# Patient Record
Sex: MALE | Race: ASIAN | NOT HISPANIC OR LATINO | URBAN - METROPOLITAN AREA
[De-identification: names, ages, dates, MRNs, and addresses within clinical notes are randomized per-mention and may not be internally consistent; named-entity substitution may affect disease eponyms.]

---

## 2024-10-15 ENCOUNTER — INPATIENT (INPATIENT)
Facility: HOSPITAL | Age: 64
LOS: 0 days | Discharge: ANOTHER HOSPITAL | End: 2024-10-16
Attending: STUDENT IN AN ORGANIZED HEALTH CARE EDUCATION/TRAINING PROGRAM | Admitting: STUDENT IN AN ORGANIZED HEALTH CARE EDUCATION/TRAINING PROGRAM
Payer: MEDICAID

## 2024-10-15 VITALS
DIASTOLIC BLOOD PRESSURE: 98 MMHG | HEART RATE: 84 BPM | SYSTOLIC BLOOD PRESSURE: 165 MMHG | WEIGHT: 164.91 LBS | RESPIRATION RATE: 18 BRPM | OXYGEN SATURATION: 99 % | TEMPERATURE: 98 F

## 2024-10-15 DIAGNOSIS — Z95.5 PRESENCE OF CORONARY ANGIOPLASTY IMPLANT AND GRAFT: Chronic | ICD-10-CM

## 2024-10-15 DIAGNOSIS — R07.9 CHEST PAIN, UNSPECIFIED: ICD-10-CM

## 2024-10-15 LAB
ALBUMIN SERPL ELPH-MCNC: 4.2 G/DL — SIGNIFICANT CHANGE UP (ref 3.3–5)
ALP SERPL-CCNC: 62 U/L — SIGNIFICANT CHANGE UP (ref 40–120)
ALT FLD-CCNC: 15 U/L — SIGNIFICANT CHANGE UP (ref 4–41)
ANION GAP SERPL CALC-SCNC: 12 MMOL/L — SIGNIFICANT CHANGE UP (ref 7–14)
APTT BLD: 30.5 SEC — SIGNIFICANT CHANGE UP (ref 24.5–35.6)
AST SERPL-CCNC: 21 U/L — SIGNIFICANT CHANGE UP (ref 4–40)
BASOPHILS # BLD AUTO: 0.05 K/UL — SIGNIFICANT CHANGE UP (ref 0–0.2)
BASOPHILS NFR BLD AUTO: 0.7 % — SIGNIFICANT CHANGE UP (ref 0–2)
BILIRUB SERPL-MCNC: 0.5 MG/DL — SIGNIFICANT CHANGE UP (ref 0.2–1.2)
BUN SERPL-MCNC: 13 MG/DL — SIGNIFICANT CHANGE UP (ref 7–23)
CALCIUM SERPL-MCNC: 9.2 MG/DL — SIGNIFICANT CHANGE UP (ref 8.4–10.5)
CHLORIDE SERPL-SCNC: 105 MMOL/L — SIGNIFICANT CHANGE UP (ref 98–107)
CO2 SERPL-SCNC: 21 MMOL/L — LOW (ref 22–31)
CREAT SERPL-MCNC: 0.73 MG/DL — SIGNIFICANT CHANGE UP (ref 0.5–1.3)
EGFR: 103 ML/MIN/1.73M2 — SIGNIFICANT CHANGE UP
EOSINOPHIL # BLD AUTO: 0.46 K/UL — SIGNIFICANT CHANGE UP (ref 0–0.5)
EOSINOPHIL NFR BLD AUTO: 6.7 % — HIGH (ref 0–6)
GLUCOSE BLDC GLUCOMTR-MCNC: 153 MG/DL — HIGH (ref 70–99)
GLUCOSE BLDC GLUCOMTR-MCNC: 88 MG/DL — SIGNIFICANT CHANGE UP (ref 70–99)
GLUCOSE SERPL-MCNC: 149 MG/DL — HIGH (ref 70–99)
HCT VFR BLD CALC: 42 % — SIGNIFICANT CHANGE UP (ref 39–50)
HGB BLD-MCNC: 14.6 G/DL — SIGNIFICANT CHANGE UP (ref 13–17)
IANC: 3.87 K/UL — SIGNIFICANT CHANGE UP (ref 1.8–7.4)
IMM GRANULOCYTES NFR BLD AUTO: 0.4 % — SIGNIFICANT CHANGE UP (ref 0–0.9)
INR BLD: 0.95 RATIO — SIGNIFICANT CHANGE UP (ref 0.85–1.16)
LIDOCAIN IGE QN: 29 U/L — SIGNIFICANT CHANGE UP (ref 7–60)
LYMPHOCYTES # BLD AUTO: 1.83 K/UL — SIGNIFICANT CHANGE UP (ref 1–3.3)
LYMPHOCYTES # BLD AUTO: 26.7 % — SIGNIFICANT CHANGE UP (ref 13–44)
MCHC RBC-ENTMCNC: 31.2 PG — SIGNIFICANT CHANGE UP (ref 27–34)
MCHC RBC-ENTMCNC: 34.8 GM/DL — SIGNIFICANT CHANGE UP (ref 32–36)
MCV RBC AUTO: 89.7 FL — SIGNIFICANT CHANGE UP (ref 80–100)
MONOCYTES # BLD AUTO: 0.61 K/UL — SIGNIFICANT CHANGE UP (ref 0–0.9)
MONOCYTES NFR BLD AUTO: 8.9 % — SIGNIFICANT CHANGE UP (ref 2–14)
NEUTROPHILS # BLD AUTO: 3.87 K/UL — SIGNIFICANT CHANGE UP (ref 1.8–7.4)
NEUTROPHILS NFR BLD AUTO: 56.6 % — SIGNIFICANT CHANGE UP (ref 43–77)
NRBC # BLD: 0 /100 WBCS — SIGNIFICANT CHANGE UP (ref 0–0)
NRBC # FLD: 0 K/UL — SIGNIFICANT CHANGE UP (ref 0–0)
NT-PROBNP SERPL-SCNC: 37 PG/ML — SIGNIFICANT CHANGE UP
PLATELET # BLD AUTO: 228 K/UL — SIGNIFICANT CHANGE UP (ref 150–400)
POTASSIUM SERPL-MCNC: 4.4 MMOL/L — SIGNIFICANT CHANGE UP (ref 3.5–5.3)
POTASSIUM SERPL-SCNC: 4.4 MMOL/L — SIGNIFICANT CHANGE UP (ref 3.5–5.3)
PROT SERPL-MCNC: 7.5 G/DL — SIGNIFICANT CHANGE UP (ref 6–8.3)
PROTHROM AB SERPL-ACNC: 11.3 SEC — SIGNIFICANT CHANGE UP (ref 9.9–13.4)
RBC # BLD: 4.68 M/UL — SIGNIFICANT CHANGE UP (ref 4.2–5.8)
RBC # FLD: 13.6 % — SIGNIFICANT CHANGE UP (ref 10.3–14.5)
SODIUM SERPL-SCNC: 138 MMOL/L — SIGNIFICANT CHANGE UP (ref 135–145)
TROPONIN T, HIGH SENSITIVITY RESULT: 9 NG/L — SIGNIFICANT CHANGE UP
WBC # BLD: 6.85 K/UL — SIGNIFICANT CHANGE UP (ref 3.8–10.5)
WBC # FLD AUTO: 6.85 K/UL — SIGNIFICANT CHANGE UP (ref 3.8–10.5)

## 2024-10-15 PROCEDURE — 99152 MOD SED SAME PHYS/QHP 5/>YRS: CPT

## 2024-10-15 PROCEDURE — 99222 1ST HOSP IP/OBS MODERATE 55: CPT

## 2024-10-15 PROCEDURE — 92978 ENDOLUMINL IVUS OCT C 1ST: CPT | Mod: 26,LD

## 2024-10-15 PROCEDURE — 71046 X-RAY EXAM CHEST 2 VIEWS: CPT | Mod: 26

## 2024-10-15 PROCEDURE — 93458 L HRT ARTERY/VENTRICLE ANGIO: CPT | Mod: 26

## 2024-10-15 PROCEDURE — 99285 EMERGENCY DEPT VISIT HI MDM: CPT

## 2024-10-15 RX ORDER — SODIUM CHLORIDE IRRIG SOLUTION 0.9 %
1000 SOLUTION, IRRIGATION IRRIGATION
Refills: 0 | Status: DISCONTINUED | OUTPATIENT
Start: 2024-10-15 | End: 2024-10-16

## 2024-10-15 RX ORDER — ALCOHOL ANTISEPTIC PADS
25 PADS, MEDICATED (EA) TOPICAL ONCE
Refills: 0 | Status: DISCONTINUED | OUTPATIENT
Start: 2024-10-15 | End: 2024-10-16

## 2024-10-15 RX ORDER — SACUBITRIL AND VALSARTAN 97; 103 MG/1; MG/1
1 TABLET, FILM COATED ORAL
Refills: 0 | Status: DISCONTINUED | OUTPATIENT
Start: 2024-10-15 | End: 2024-10-16

## 2024-10-15 RX ORDER — SODIUM CHLORIDE 0.9 % (FLUSH) 0.9 %
1000 SYRINGE (ML) INJECTION
Refills: 0 | Status: DISCONTINUED | OUTPATIENT
Start: 2024-10-15 | End: 2024-10-16

## 2024-10-15 RX ORDER — ALCOHOL ANTISEPTIC PADS
12.5 PADS, MEDICATED (EA) TOPICAL ONCE
Refills: 0 | Status: DISCONTINUED | OUTPATIENT
Start: 2024-10-15 | End: 2024-10-16

## 2024-10-15 RX ORDER — INFLUENZA VIRUS VACCINE 15; 15; 15; 15 UG/.5ML; UG/.5ML; UG/.5ML; UG/.5ML
0.5 SUSPENSION INTRAMUSCULAR ONCE
Refills: 0 | Status: DISCONTINUED | OUTPATIENT
Start: 2024-10-15 | End: 2024-10-16

## 2024-10-15 RX ORDER — ROSUVASTATIN CALCIUM 20 MG/1
20 TABLET, COATED ORAL AT BEDTIME
Refills: 0 | Status: DISCONTINUED | OUTPATIENT
Start: 2024-10-15 | End: 2024-10-16

## 2024-10-15 RX ORDER — CARVEDILOL 3.125 MG
3.12 TABLET ORAL EVERY 12 HOURS
Refills: 0 | Status: DISCONTINUED | OUTPATIENT
Start: 2024-10-15 | End: 2024-10-16

## 2024-10-15 RX ORDER — ALCOHOL ANTISEPTIC PADS
15 PADS, MEDICATED (EA) TOPICAL ONCE
Refills: 0 | Status: DISCONTINUED | OUTPATIENT
Start: 2024-10-15 | End: 2024-10-16

## 2024-10-15 RX ORDER — TICAGRELOR 60 MG/1
90 TABLET ORAL EVERY 12 HOURS
Refills: 0 | Status: DISCONTINUED | OUTPATIENT
Start: 2024-10-15 | End: 2024-10-16

## 2024-10-15 RX ORDER — GLUCAGON INJECTION, SOLUTION 0.5 MG/.1ML
1 INJECTION, SOLUTION SUBCUTANEOUS ONCE
Refills: 0 | Status: DISCONTINUED | OUTPATIENT
Start: 2024-10-15 | End: 2024-10-16

## 2024-10-15 RX ORDER — INSULIN LISPRO 100/ML
VIAL (ML) SUBCUTANEOUS AT BEDTIME
Refills: 0 | Status: DISCONTINUED | OUTPATIENT
Start: 2024-10-15 | End: 2024-10-16

## 2024-10-15 RX ORDER — INSULIN LISPRO 100/ML
VIAL (ML) SUBCUTANEOUS
Refills: 0 | Status: DISCONTINUED | OUTPATIENT
Start: 2024-10-15 | End: 2024-10-16

## 2024-10-15 RX ORDER — INSULIN GLARGINE 300 U/ML
20 INJECTION, SOLUTION SUBCUTANEOUS AT BEDTIME
Refills: 0 | Status: DISCONTINUED | OUTPATIENT
Start: 2024-10-15 | End: 2024-10-16

## 2024-10-15 RX ORDER — ASPIRIN 325 MG
81 TABLET ORAL DAILY
Refills: 0 | Status: DISCONTINUED | OUTPATIENT
Start: 2024-10-15 | End: 2024-10-16

## 2024-10-15 RX ADMIN — Medication 3.12 MILLIGRAM(S): at 18:46

## 2024-10-15 RX ADMIN — Medication 81 MILLIGRAM(S): at 18:41

## 2024-10-15 RX ADMIN — ROSUVASTATIN CALCIUM 20 MILLIGRAM(S): 20 TABLET, COATED ORAL at 21:13

## 2024-10-15 RX ADMIN — INSULIN GLARGINE 20 UNIT(S): 300 INJECTION, SOLUTION SUBCUTANEOUS at 21:23

## 2024-10-15 RX ADMIN — TICAGRELOR 90 MILLIGRAM(S): 60 TABLET ORAL at 18:42

## 2024-10-15 RX ADMIN — Medication 100 MILLILITER(S): at 18:34

## 2024-10-15 NOTE — ED PROVIDER NOTE - ATTENDING CONTRIBUTION TO CARE
Agree with medical student note as well as resident documentation  64-year-old female with past medical history of CHF, CAD with multiple stents, diabetes, hypertension, hyperlipidemia presents with chest pain and shortness of breath that started a few days ago.  Patient states pain is not exertional.  Last cath in 2022.  States took 2 sublingual nitros which did improve symptoms.  Patient states took his anticoagulation this morning.  Patient was instructed by cardiologist to present to the emergency room for possible cath.  Spoke to cardiologist who would like patient admitted.  Physical exam  Gen: pt well appearing in no respiratory distress  vital signs stable  NCAT  Lungs: CTAB/L  Cardiac: s1 s2 no m/r/g  abdomen: soft/NT/ND  ext: no edema  Neuro: CNs intact 5/5 motor UE and LE; sensation intact; gait stable  skin: no rash  EKG left bundle branch block (old)  Impression  Patient with CAD presenting with chest pain and shortness of breath will admit for further cardiology workup

## 2024-10-15 NOTE — ED PROVIDER NOTE - OBJECTIVE STATEMENT
65 y/o M [facility] PMHx of CHF, cardiac stents x5 (2020, 2021), cardiac cath (2022), DM LBBB, HTN, and HLD presenting with chest pain and SOB that started a few days ago. Pt reports non-radiating left-sided chest pain that started several days ago while he was resting, with associated shortness of breath. Pain is worse with walking. Pt had sharp pain this morning, which was slightly improved with 2 tabs of sublingual NG at 9AM, but decided to come in as pain did not subside and was told by his cardiologist that he may need a possible cath. Pt describes pain as being different from previous times before he got his stents placed. Pt is adherent to all his meds, and took aspirin and Brilinta at 8AM today. 65 y/o M with PMHx of CHF, cardiac stents x5 (2020, 2021), cardiac cath (2022), DM LBBB, HTN, and HLD presenting with chest pain and SOB that started a few days ago. Pt reports non-radiating left-sided chest pain that started several days ago while he was resting, with associated shortness of breath. Pain is not worsen with walking. Pt had sharp pain this morning, which was slightly improved with 2 tabs of sublingual nitroglycerin at 9AM, but decided to come in as pain did not subside and was told by his cardiologist that he may need a possible cath. Pt describes pain as being different from previous times before he got his stents placed. Pt is adherent to all his meds, and took aspirin 81mg and Brilinta at 8AM today. Denies any fevers, chills, jaw pain, numbness/tinging, abdominal pain, urinary symptoms.

## 2024-10-15 NOTE — PATIENT PROFILE ADULT - FALL HARM RISK - UNIVERSAL INTERVENTIONS
Bed in lowest position, wheels locked, appropriate side rails in place/Call bell, personal items and telephone in reach/Instruct patient to call for assistance before getting out of bed or chair/Non-slip footwear when patient is out of bed/[place] to call system/Physically safe environment - no spills, clutter or unnecessary equipment/Purposeful Proactive Rounding/Room/bathroom lighting operational, light cord in reach

## 2024-10-15 NOTE — H&P CARDIOLOGY - NSICDXPASTMEDICALHX_GEN_ALL_CORE_FT
PAST MEDICAL HISTORY:  CAD (coronary artery disease)     CHF (congestive heart failure)     DM (diabetes mellitus)     HLD (hyperlipidemia)     HTN (hypertension)

## 2024-10-15 NOTE — H&P CARDIOLOGY - COMMENTS
Pre Procedural Sedation Evaluation    Urine pregnancy: N/A  Dentures: None  Last PO intake: 10/15/2024 at 08:30  Obstructive sleep apnea: No  Aspiration risk: No  Mallampati score: 2  ASA Classification: 3  Prior Sedative or Anesthesia Experience: No complications  Informed consent by responsible adult: Yes  Responsible adult escort: Yes  Based on today's assessment, anesthesia consult requested: No (over 8 hours since patient ate)

## 2024-10-15 NOTE — ED PROVIDER NOTE - CLINICAL SUMMARY MEDICAL DECISION MAKING FREE TEXT BOX
63 y/o M with CHF, cardiac stents x5, DM, HTN, and HLD presenting with chest pain and shortness of breath for several days. Pt was told to present to ED for possible cardiac cath. Pt is s/p NGx2 at 9AM and s/p Brilinta and ASA at 8AM.     Plan:  EKG  CXR  CBC, CMP, pro-BNP, cardiac enzymes 63 y/o M with CHF, cardiac stents x5, DM, HTN, and HLD presenting with chest pain and shortness of breath for several days. Pt was told to present to ED for possible cardiac cath. Pt is s/p NGx2 at 9AM and s/p Brilinta and ASA at 8AM. Referred by his cardiologist Dr. Wilver Caldwell for possible stent.    Plan: Given significant cardiac history and recent evaluation sent from cardiologist, will work up for high risk chest pain. Differential includes ACS vs costochondritis. Less likely gastris or GERD. Will obtain EKG, CXR, CBC, CMP, pro-BNP, troponin and coags. Will consult cardiology Dr. Summer Pettit.

## 2024-10-15 NOTE — CHART NOTE - NSCHARTNOTEFT_GEN_A_CORE
Overnight Medicine ACP Coverage    Patient is s/p cardiac cath via right radial access. Patient without any complaints. Site is stable with no hematoma or active bleed noted. No swelling, dressing is clean/intact/dry. Right Radial pulse palpable.  strength is equal bilaterally. Sensation intact bilaterally. Patient has full ROM in right wrist. Capillary refill < 2 seconds.     T(C): 36.7 (10-15-24 @ 20:00), Max: 37.1 (10-15-24 @ 15:46)  HR: 60 (10-15-24 @ 20:00) (58 - 84)  BP: 133/64 (10-15-24 @ 20:00) (120/67 - 165/98)  RR: 16 (10-15-24 @ 20:00) (14 - 18)  SpO2: 99% (10-15-24 @ 20:00) (99% - 100%)    Educated patient to inform nursing for any numbness and/or tingling of extremities, signs of bleeding, chest pain, shortness of breath or any other concerning symptoms. Patient verbalized understanding. Will continue to monitor.    Gerson Jesus PA-C  Department of Medicine  [facility]   In House Page [phone]

## 2024-10-15 NOTE — ED ADULT TRIAGE NOTE - CHIEF COMPLAINT QUOTE
pt c/o chest pain and SOB x a few days.  pt was seen by his cardiologist and was told to come to ED for possible cath.  Hx:  CHF, cardiac stents x 5, DM, LBBB, HTN, HLD, bradycardia

## 2024-10-15 NOTE — ASU PATIENT PROFILE, ADULT - FALL HARM RISK - UNIVERSAL INTERVENTIONS
Bed in lowest position, wheels locked, appropriate side rails in place/Call bell, personal items and telephone in reach/Instruct patient to call for assistance before getting out of bed or chair/Non-slip footwear when patient is out of bed/[place] to call system/Physically safe environment - no spills, clutter or unnecessary equipment/Purposeful Proactive Rounding/Room/bathroom lighting operational, light cord in reach

## 2024-10-15 NOTE — ED ADULT NURSE NOTE - NSFALLUNIVINTERV_ED_ALL_ED
Bed/Stretcher in lowest position, wheels locked, appropriate side rails in place/Call bell, personal items and telephone in reach/Instruct patient to call for assistance before getting out of bed/chair/stretcher/Non-slip footwear applied when patient is off stretcher/[place] to call system/Physically safe environment - no spills, clutter or unnecessary equipment/Purposeful proactive rounding/Room/bathroom lighting operational, light cord in reach

## 2024-10-15 NOTE — ASU PATIENT PROFILE, ADULT - NSICDXPASTMEDICALHX_GEN_ALL_CORE_FT
PAST MEDICAL HISTORY:  Heart failure     History of myocardial infarction     Hyperlipidemia     Hypertension     Type 2 diabetes mellitus

## 2024-10-15 NOTE — H&P CARDIOLOGY - HISTORY OF PRESENT ILLNESS
65 y/o male with a PMHx of CAD s/p stent placement x 5, ischemic cardiomyopathy (unknown LVEF), HTN, HLD and DM presented to ED with chest pain. Pt has been experiencing constant but waxing and waning left sided chest pain for 4 days. Pt describes the pain as a mild/dull pain on the left side of his chest by his arm pit. Pt does not admit to any exertional or pleuritic component. Pt says the pain occurs throughout the day and is not worsened or improved with exertion or activity. Pt does admit to mild shortness of breath on exertion. Pt said his pain was a little more severe this morning and he took 2 SL nitro tabs, which mildly helped his symptoms. Pt spoke with his cardiologist, Dr. Pettit, who recommended for patient to come to the ED. Pt denies fever, chills, recent travel, headache, dizziness, visual deficits, orthopnea, palpitations, abdominal pain, N/V/D/C, hematochezia, melena, dysuria, hematuria, LOC, syncope, peripheral edema. Upon arrival to ED, EKG showed NSR at 74 bpm with LBBB and LAD, no ischemic changes. Initial troponin 9. Pt is now admitted for cardiac catheterization.     Cardiologist: Dr. Shaik Pettit

## 2024-10-16 ENCOUNTER — INPATIENT (INPATIENT)
Facility: HOSPITAL | Age: 64
LOS: 0 days | Discharge: HOME | DRG: 313 | End: 2024-10-17
Attending: STUDENT IN AN ORGANIZED HEALTH CARE EDUCATION/TRAINING PROGRAM | Admitting: STUDENT IN AN ORGANIZED HEALTH CARE EDUCATION/TRAINING PROGRAM
Payer: MEDICAID

## 2024-10-16 VITALS
TEMPERATURE: 97 F | OXYGEN SATURATION: 100 % | SYSTOLIC BLOOD PRESSURE: 122 MMHG | HEART RATE: 63 BPM | DIASTOLIC BLOOD PRESSURE: 67 MMHG | RESPIRATION RATE: 18 BRPM

## 2024-10-16 VITALS — HEIGHT: 67.99 IN | WEIGHT: 164.91 LBS

## 2024-10-16 DIAGNOSIS — Z95.5 PRESENCE OF CORONARY ANGIOPLASTY IMPLANT AND GRAFT: Chronic | ICD-10-CM

## 2024-10-16 DIAGNOSIS — R07.9 CHEST PAIN, UNSPECIFIED: ICD-10-CM

## 2024-10-16 LAB
ANION GAP SERPL CALC-SCNC: 14 MMOL/L — SIGNIFICANT CHANGE UP (ref 7–14)
BUN SERPL-MCNC: 16 MG/DL — SIGNIFICANT CHANGE UP (ref 7–23)
CALCIUM SERPL-MCNC: 8.9 MG/DL — SIGNIFICANT CHANGE UP (ref 8.4–10.5)
CHLORIDE SERPL-SCNC: 106 MMOL/L — SIGNIFICANT CHANGE UP (ref 98–107)
CO2 SERPL-SCNC: 19 MMOL/L — LOW (ref 22–31)
CREAT SERPL-MCNC: 0.68 MG/DL — SIGNIFICANT CHANGE UP (ref 0.5–1.3)
EGFR: 105 ML/MIN/1.73M2 — SIGNIFICANT CHANGE UP
GLUCOSE BLDC GLUCOMTR-MCNC: 105 MG/DL — HIGH (ref 70–99)
GLUCOSE BLDC GLUCOMTR-MCNC: 160 MG/DL — HIGH (ref 70–99)
GLUCOSE BLDC GLUCOMTR-MCNC: 186 MG/DL — HIGH (ref 70–99)
GLUCOSE BLDC GLUCOMTR-MCNC: 67 MG/DL — LOW (ref 70–99)
GLUCOSE BLDC GLUCOMTR-MCNC: 76 MG/DL — SIGNIFICANT CHANGE UP (ref 70–99)
GLUCOSE BLDC GLUCOMTR-MCNC: 97 MG/DL — SIGNIFICANT CHANGE UP (ref 70–99)
GLUCOSE SERPL-MCNC: 91 MG/DL — SIGNIFICANT CHANGE UP (ref 70–99)
HCT VFR BLD CALC: 43.9 % — SIGNIFICANT CHANGE UP (ref 39–50)
HGB BLD-MCNC: 15 G/DL — SIGNIFICANT CHANGE UP (ref 13–17)
MAGNESIUM SERPL-MCNC: 2.2 MG/DL — SIGNIFICANT CHANGE UP (ref 1.6–2.6)
MCHC RBC-ENTMCNC: 31.7 PG — SIGNIFICANT CHANGE UP (ref 27–34)
MCHC RBC-ENTMCNC: 34.2 GM/DL — SIGNIFICANT CHANGE UP (ref 32–36)
MCV RBC AUTO: 92.8 FL — SIGNIFICANT CHANGE UP (ref 80–100)
NRBC # BLD: 0 /100 WBCS — SIGNIFICANT CHANGE UP (ref 0–0)
NRBC # FLD: 0 K/UL — SIGNIFICANT CHANGE UP (ref 0–0)
PHOSPHATE SERPL-MCNC: 3.7 MG/DL — SIGNIFICANT CHANGE UP (ref 2.5–4.5)
PLATELET # BLD AUTO: 227 K/UL — SIGNIFICANT CHANGE UP (ref 150–400)
POTASSIUM SERPL-MCNC: 4 MMOL/L — SIGNIFICANT CHANGE UP (ref 3.5–5.3)
POTASSIUM SERPL-SCNC: 4 MMOL/L — SIGNIFICANT CHANGE UP (ref 3.5–5.3)
RBC # BLD: 4.73 M/UL — SIGNIFICANT CHANGE UP (ref 4.2–5.8)
RBC # FLD: 14 % — SIGNIFICANT CHANGE UP (ref 10.3–14.5)
SODIUM SERPL-SCNC: 139 MMOL/L — SIGNIFICANT CHANGE UP (ref 135–145)
WBC # BLD: 7.19 K/UL — SIGNIFICANT CHANGE UP (ref 3.8–10.5)
WBC # FLD AUTO: 7.19 K/UL — SIGNIFICANT CHANGE UP (ref 3.8–10.5)

## 2024-10-16 PROCEDURE — 99238 HOSP IP/OBS DSCHRG MGMT 30/<: CPT

## 2024-10-16 RX ORDER — SODIUM CHLORIDE IRRIG SOLUTION 0.9 %
1000 SOLUTION, IRRIGATION IRRIGATION
Refills: 0 | Status: DISCONTINUED | OUTPATIENT
Start: 2024-10-16 | End: 2024-10-17

## 2024-10-16 RX ORDER — TICAGRELOR 60 MG/1
90 TABLET ORAL EVERY 12 HOURS
Refills: 0 | Status: DISCONTINUED | OUTPATIENT
Start: 2024-10-16 | End: 2024-10-17

## 2024-10-16 RX ORDER — ROSUVASTATIN CALCIUM 20 MG/1
1 TABLET, COATED ORAL
Refills: 0 | DISCHARGE

## 2024-10-16 RX ORDER — TICAGRELOR 60 MG/1
1 TABLET ORAL
Refills: 0 | DISCHARGE
Start: 2024-10-16

## 2024-10-16 RX ORDER — ALCOHOL ANTISEPTIC PADS
12.5 PADS, MEDICATED (EA) TOPICAL ONCE
Refills: 0 | Status: DISCONTINUED | OUTPATIENT
Start: 2024-10-16 | End: 2024-10-17

## 2024-10-16 RX ORDER — ASPIRIN 325 MG
81 TABLET ORAL DAILY
Refills: 0 | Status: DISCONTINUED | OUTPATIENT
Start: 2024-10-16 | End: 2024-10-17

## 2024-10-16 RX ORDER — ALCOHOL ANTISEPTIC PADS
15 PADS, MEDICATED (EA) TOPICAL ONCE
Refills: 0 | Status: DISCONTINUED | OUTPATIENT
Start: 2024-10-16 | End: 2024-10-17

## 2024-10-16 RX ORDER — INSULIN REGULAR, HUMAN 100/ML
0 VIAL (ML) INJECTION
Refills: 0 | DISCHARGE

## 2024-10-16 RX ORDER — ROSUVASTATIN CALCIUM 20 MG/1
20 TABLET, COATED ORAL AT BEDTIME
Refills: 0 | Status: DISCONTINUED | OUTPATIENT
Start: 2024-10-16 | End: 2024-10-17

## 2024-10-16 RX ORDER — DAPAGLIFLOZIN 10 MG/1
1 TABLET, FILM COATED ORAL
Refills: 0 | DISCHARGE

## 2024-10-16 RX ORDER — ALCOHOL ANTISEPTIC PADS
12.5 PADS, MEDICATED (EA) TOPICAL ONCE
Refills: 0 | Status: COMPLETED | OUTPATIENT
Start: 2024-10-16 | End: 2024-10-16

## 2024-10-16 RX ORDER — TICAGRELOR 60 MG/1
1 TABLET ORAL
Refills: 0 | DISCHARGE

## 2024-10-16 RX ORDER — INSULIN GLARGINE 300 U/ML
20 INJECTION, SOLUTION SUBCUTANEOUS AT BEDTIME
Refills: 0 | Status: DISCONTINUED | OUTPATIENT
Start: 2024-10-16 | End: 2024-10-17

## 2024-10-16 RX ORDER — CARVEDILOL 3.125 MG
1 TABLET ORAL
Refills: 0 | DISCHARGE

## 2024-10-16 RX ORDER — INSULIN GLARGINE 300 U/ML
25 INJECTION, SOLUTION SUBCUTANEOUS
Refills: 0 | DISCHARGE

## 2024-10-16 RX ORDER — INSULIN GLARGINE 300 U/ML
20 INJECTION, SOLUTION SUBCUTANEOUS
Refills: 0 | DISCHARGE

## 2024-10-16 RX ORDER — INSULIN GLARGINE 300 U/ML
20 INJECTION, SOLUTION SUBCUTANEOUS
Qty: 0 | Refills: 0 | DISCHARGE
Start: 2024-10-16

## 2024-10-16 RX ORDER — SITAGLIPTIN PHOSPHATE 100 MG
1 TABLET ORAL
Refills: 0 | DISCHARGE

## 2024-10-16 RX ORDER — ALCOHOL ANTISEPTIC PADS
25 PADS, MEDICATED (EA) TOPICAL ONCE
Refills: 0 | Status: DISCONTINUED | OUTPATIENT
Start: 2024-10-16 | End: 2024-10-17

## 2024-10-16 RX ORDER — SACUBITRIL AND VALSARTAN 97; 103 MG/1; MG/1
1 TABLET, FILM COATED ORAL
Refills: 0 | Status: DISCONTINUED | OUTPATIENT
Start: 2024-10-16 | End: 2024-10-17

## 2024-10-16 RX ORDER — SACUBITRIL AND VALSARTAN 97; 103 MG/1; MG/1
1 TABLET, FILM COATED ORAL
Refills: 0 | DISCHARGE

## 2024-10-16 RX ORDER — CARVEDILOL 3.125 MG
1 TABLET ORAL
Refills: 0 | DISCHARGE
Start: 2024-10-16

## 2024-10-16 RX ORDER — CARVEDILOL 3.125 MG
3.12 TABLET ORAL EVERY 12 HOURS
Refills: 0 | Status: DISCONTINUED | OUTPATIENT
Start: 2024-10-16 | End: 2024-10-17

## 2024-10-16 RX ORDER — INSULIN LISPRO 100/ML
VIAL (ML) SUBCUTANEOUS
Refills: 0 | Status: DISCONTINUED | OUTPATIENT
Start: 2024-10-16 | End: 2024-10-17

## 2024-10-16 RX ORDER — GLUCAGON INJECTION, SOLUTION 0.5 MG/.1ML
1 INJECTION, SOLUTION SUBCUTANEOUS ONCE
Refills: 0 | Status: DISCONTINUED | OUTPATIENT
Start: 2024-10-16 | End: 2024-10-17

## 2024-10-16 RX ORDER — SACUBITRIL AND VALSARTAN 97; 103 MG/1; MG/1
1 TABLET, FILM COATED ORAL
Refills: 0 | DISCHARGE
Start: 2024-10-16

## 2024-10-16 RX ORDER — INSULIN LISPRO 100/ML
VIAL (ML) SUBCUTANEOUS AT BEDTIME
Refills: 0 | Status: DISCONTINUED | OUTPATIENT
Start: 2024-10-16 | End: 2024-10-17

## 2024-10-16 RX ADMIN — TICAGRELOR 90 MILLIGRAM(S): 60 TABLET ORAL at 05:27

## 2024-10-16 RX ADMIN — Medication 81 MILLIGRAM(S): at 11:33

## 2024-10-16 RX ADMIN — SACUBITRIL AND VALSARTAN 1 TABLET(S): 97; 103 TABLET, FILM COATED ORAL at 05:27

## 2024-10-16 RX ADMIN — SACUBITRIL AND VALSARTAN 1 TABLET(S): 97; 103 TABLET, FILM COATED ORAL at 17:06

## 2024-10-16 RX ADMIN — Medication 3.12 MILLIGRAM(S): at 17:06

## 2024-10-16 RX ADMIN — INSULIN GLARGINE 20 UNIT(S): 300 INJECTION, SOLUTION SUBCUTANEOUS at 22:34

## 2024-10-16 RX ADMIN — TICAGRELOR 90 MILLIGRAM(S): 60 TABLET ORAL at 17:06

## 2024-10-16 RX ADMIN — Medication 3.12 MILLIGRAM(S): at 05:27

## 2024-10-16 RX ADMIN — Medication 12.5 GRAM(S): at 16:00

## 2024-10-16 RX ADMIN — ROSUVASTATIN CALCIUM 20 MILLIGRAM(S): 20 TABLET, COATED ORAL at 22:34

## 2024-10-16 NOTE — H&P CARDIOLOGY - NS ATTEND AMEND GEN_ALL_CORE FT
Pt presenting with chest pain concerning for unstable angina. Pt has Hx of multiple PCIs including previous LM bifurcation stenting done at [place]. Plan for cardiac cath today to evaluate chest pain. Pt presenting with chest pain concerning for unstable angina.  Pt has Hx of multiple PCIs including previous LM bifurcation stenting done at [place]. Pt had angiogram done at [facility] which showed severe LCx disease. IVUS was performed and confirmed that lesion in prox and distal LCx is ISR with neointimal hyperplasia. Plan for LHC with DCB.

## 2024-10-16 NOTE — PROGRESS NOTE ADULT - SUBJECTIVE AND OBJECTIVE BOX
Patient seen and examined at bedside. HDS.  Pt denies CP, SOB, palpitations, diaphoresis, dizziness, Syncope, LE swelling, acute bleed or any other complaints at this time    Overnight Events:  None      PERTINENT REVIEW OF SYSTEMS:  Constitutional:     [ ] negative [ ] fevers [ ] chills [ ] weight loss [ ] weight gain  CV:                         [ ] negative  [ ] chest pain [ ] orthopnea [ ] palpitations [ ] murmur  Resp:                     [ ] negative [ ] cough [ ] shortness of breath [ ] dyspnea [ ] wheezing [ ] sputum [ ]hemoptysis  GI:                          [ ] negative [ ] nausea [ ] vomiting [ ] diarrhea [ ] constipation [ ] abd pain [ ] dysphagia   Skin:                       [ ] negative [ ] rash [ ] itch  Neurological:        [ ] negative [ ] headache [ ] dizziness [ ] syncope [ ] weakness [ ] numbness  Psychiatric:           [ ] negative [ ] anxiety [ ] depression  Endocrine:            [ ] negative [ ] diabetes [ ] thyroid problem  Heme/Lymph:      [ ] negative [ ] anemia [ ] bleeding problem  Allergic/Immune: [ ] negative [ ] itchy eyes [ ] nasal discharge [ ] hives [ ] angioedema    [x] All other systems negative  [ ] Unable to assess ROS due to    Home Medications  Home Medications:  aspirin 81 mg oral capsule: 1 tab(s) orally once a day (15 Oct 2024 16:15)  Brilinta (ticagrelor) 60 mg oral tablet: 1 tab(s) orally 2 times a day (15 Oct 2024 16:16)  carvedilol 3.125 mg oral tablet: 1 tab(s) orally 2 times a day (15 Oct 2024 16:16)  Entresto 24 mg-26 mg oral tablet: 1 tab(s) orally 2 times a day (15 Oct 2024 16:16)  Farxiga 10 mg oral tablet: 1 tab(s) orally once a day (15 Oct 2024 16:17)  Januvia 100 mg oral tablet: 1 tab(s) orally once a day (15 Oct 2024 16:17)  Lantus 100 units/mL subcutaneous solution: 25 unit(s) subcutaneous once a day (at bedtime) (15 Oct 2024 16:18)  rosuvastatin 20 mg oral tablet: 1 tab(s) orally once a day (at bedtime) (15 Oct 2024 16:18)      Current Meds:  aspirin enteric coated 81 milliGRAM(s) Oral daily  carvedilol 3.125 milliGRAM(s) Oral every 12 hours  dextrose 5%. 1000 milliLiter(s) IV Continuous <Continuous>  dextrose 5%. 1000 milliLiter(s) IV Continuous <Continuous>  dextrose 50% Injectable 25 Gram(s) IV Push once  dextrose 50% Injectable 12.5 Gram(s) IV Push once  dextrose 50% Injectable 25 Gram(s) IV Push once  dextrose Oral Gel 15 Gram(s) Oral once PRN  glucagon  Injectable 1 milliGRAM(s) IntraMuscular once  influenza   Vaccine 0.5 milliLiter(s) IntraMuscular once  insulin glargine Injectable (LANTUS) 20 Unit(s) SubCutaneous at bedtime  insulin lispro (ADMELOG) corrective regimen sliding scale   SubCutaneous at bedtime  insulin lispro (ADMELOG) corrective regimen sliding scale   SubCutaneous three times a day before meals  rosuvastatin 20 milliGRAM(s) Oral at bedtime  sacubitril 24 mG/valsartan 26 mG 1 Tablet(s) Oral two times a day  sodium chloride 0.9%. 1000 milliLiter(s) IV Continuous <Continuous>  ticagrelor 90 milliGRAM(s) Oral every 12 hours      PAST MEDICAL & SURGICAL HISTORY:  CAD (coronary artery disease)      HTN (hypertension)      HLD (hyperlipidemia)      DM (diabetes mellitus)      CHF (congestive heart failure)      S/P coronary artery stent placement      Vitals:  T(F): 97.8 (10-16), Max: 98.9 (10-15)  HR: 66 (10-16) (58 - 70)  BP: 111/63 (10-16) (102/52 - 152/77)  RR: 17 (10-16)  SpO2: 100% (10-16)  I&O's Summary      Physical Exam:  Appearance: No acute distress; well appearing  Neck: Supple, no JVD B/L, no Carotid Bruit B/L  Cardiovascular: RRR, S1, S2, no murmurs, rubs, or gallops, no edema  Respiratory: Clear to auscultation bilaterally, no RRW B/L  Gastrointestinal: soft, non-tender, non-distended with normal bowel sounds  Neurologic: No focal or neuro deficits noted  Psychiatry: AAOx3, mood & affect appropriate  Access Site: RRA.  Stable, C/D/I, w/o hematoma or bleeding, pulses intact, back to baseline                          15.0   7.19  )-----------( 227      ( 16 Oct 2024 06:10 )             43.9     10-16    139  |  106  |  16  ----------------------------<  91  4.0   |  19[L]  |  0.68    Ca    8.9      16 Oct 2024 06:10  Phos  3.7     10-16  Mg     2.20     10-16    TPro  7.5  /  Alb  4.2  /  TBili  0.5  /  DBili  x   /  AST  21  /  ALT  15  /  AlkPhos  62  10-15    PT/INR - ( 15 Oct 2024 11:15 )   PT: 11.3 sec;   INR: 0.95 ratio         PTT - ( 15 Oct 2024 11:15 )  PTT:30.5 sec      Interpretation of Telemetry: Sinus Rhythm

## 2024-10-16 NOTE — PROGRESS NOTE ADULT - ASSESSMENT
63 y/o male with a PMHx of CAD s/p stent placement x 5, ischemic cardiomyopathy (unknown LVEF), HTN, HLD and DM presented to ED with chest pain and was admitted for ACS R/O.  Upon arrival to ED, EKG showed NSR at 74 bpm with LBBB and LAD, no ischemic changes. Initial troponin 9.  Pt underwent LHC 10/15:  prox LCx 80% ISR. mid LCx 80% ISR.     # Unstable Angina    - HDS, VSS  - Trops 9  - 10/15:  EKG showed NSR at 74 bpm with LBBB and LAD, no ischemic changes.   - 10/15 LHC: prox LCx 80% ISR. mid LCx 80% ISR. RRA access  - Access site stable w/o hematoma or bleed  - continue ASA 81mg Q, Brilinta 90mg BID, Rosuvastatin 20mg QHS  - continue Carvedilol 3.125mg q12h                                                                                                                                                               - continue Entresto 24/26mg q12  - Case discussed with interventional cardiologist.  -Plans for transfer today to [facility] for Staged PCI with DCB of LCx with Dr Pettit

## 2024-10-16 NOTE — PROGRESS NOTE ADULT - NS ATTEND AMEND GEN_ALL_CORE FT
Agree with above. Pt to be transferred to [facility] for DCB of LCx ISR.

## 2024-10-16 NOTE — DISCHARGE NOTE PROVIDER - INSTRUCTIONS
No show letter sent Diet, NPO after Midnight:      NPO Start Date: 15-Oct-2024,   NPO Start Time: 23:59  Except Medications  With Ice Chips/Sips of Water

## 2024-10-16 NOTE — PATIENT PROFILE ADULT - FALL HARM RISK - UNIVERSAL INTERVENTIONS
no Bed in lowest position, wheels locked, appropriate side rails in place/Call bell, personal items and telephone in reach/Instruct patient to call for assistance before getting out of bed or chair/Non-slip footwear when patient is out of bed/[place] to call system/Physically safe environment - no spills, clutter or unnecessary equipment/Purposeful Proactive Rounding/Room/bathroom lighting operational, light cord in reach

## 2024-10-16 NOTE — DISCHARGE NOTE PROVIDER - HOSPITAL COURSE
65 y/o male with a PMHx of CAD s/p stent placement x 5, ischemic cardiomyopathy (unknown LVEF), HTN, HLD and DM presented to ED with chest pain and was admitted for ACS R/O.  Upon arrival to ED, EKG showed NSR at 74 bpm with LBBB and LAD, no ischemic changes. Initial troponin 9.  Pt underwent LHC 10/15:  prox LCx 80% ISR. mid LCx 80% ISR.     Unstable Angina  - 10/15:  EKG showed NSR at 74 bpm with LBBB and LAD, no ischemic changes.   - 10/15 LHC: prox LCx 80% ISR. mid LCx 80% ISR. RRA access  - Access site stable w/o hematoma or bleed  - continue ASA 81mg Q, Brilinta 90mg BID, Rosuvastatin 20mg QHS  - continue Carvedilol 3.125mg q12h                                                                                                                                                               - continue Entresto 24/26mg q12  - Case discussed with interventional cardiologist - Plans for transfer today to [facility] for Staged PCI with DCB of LCx with Dr Pettit

## 2024-10-16 NOTE — DISCHARGE NOTE NURSING/CASE MANAGEMENT/SOCIAL WORK - FINANCIAL ASSISTANCE
[facility] provides services at a reduced cost to those who are determined to be eligible through [facility]’s financial assistance program. Information regarding [facility]’s financial assistance program can be found by going to [email or web address] or by calling [phone].

## 2024-10-16 NOTE — H&P CARDIOLOGY - HISTORY OF PRESENT ILLNESS
65 y/o male with a PMHx of CAD s/p stent placement x 5, ischemic cardiomyopathy (unknown LVEF), HTN, HLD and DM presented to ED with chest pain and was admitted for ACS R/O.  Upon arrival to ED, EKG showed NSR at 74 bpm with LBBB and LAD, no ischemic changes. Initial troponin 9.  Pt underwent LHC 10/15:  prox LCx 80% ISR. mid LCx 80% ISR.   - 10/15:  EKG showed NSR at 74 bpm with LBBB and LAD, no ischemic changes.   - 10/15 LHC: prox LCx 80% ISR. mid LCx 80% ISR. RRA access  - Access site stable w/o hematoma or bleed.   Plans for transfer today to [facility] for Staged PCI with DCB of LCx with Dr Pettit.    63 y/o male with a PMHx of CAD s/p stent placement x 5, ischemic cardiomyopathy (unknown LVEF), HTN, HLD and DM presented to ED with chest pain and was admitted for ACS R/O on 10/15/24.  Upon arrival to ED, EKG showed NSR at 74 bpm with LBBB and LAD, no ischemic changes. Initial troponin 9.  Pt underwent LHC 10/15:  prox LCx 80% ISR. mid LCx 80% ISR.   - 10/15:  EKG showed NSR at 74 bpm with LBBB and LAD, no ischemic changes.   - 10/15 LHC: prox LCx 80% ISR. mid LCx 80% ISR. RRA access  - Access site stable w/o hematoma or bleed.   Plans for transfer today to [facility] for Staged PCI with DCB of LCx with Dr Pettit.    65 y/o male with a PMHx of CAD s/p stent placement x 5, ischemic cardiomyopathy (unknown LVEF), HTN, HLD and DM presented to ED with chest pain and was admitted for ACS R/O on 10/15/24.  Upon arrival to ED, EKG showed NSR at 74 bpm with LBBB and LAD, no ischemic changes. Initial troponin 9.  Pt underwent LHC 10/15:  prox LCx 80% ISR. mid LCx 80% ISR.   - 10/15:  EKG showed NSR at 74 bpm with LBBB and LAD, no ischemic changes.   - 10/15 LHC: prox LCx 80% ISR. mid LCx 80% ISR. RRA access  - Access site stable w/o hematoma or bleed.   Plans for transfer today to [facility] for Staged PCI with DCB of LCx with Dr Pettit.     Pt still in Transit, please complete H&P on arrival of patient.  65 y/o male with a PMHx of CAD s/p stent placement x 5, ischemic cardiomyopathy (unknown LVEF), HTN, HLD and DM presented to ED with chest pain and was admitted for ACS R/O on 10/15/24.  Upon arrival to ED, EKG showed NSR at 74 bpm with LBBB and LAD, no ischemic changes. Initial troponin 9.  Pt underwent LHC 10/15:  prox LCx 80% ISR. mid LCx 80% ISR.   - 10/15:  EKG showed NSR at 74 bpm with LBBB and LAD, no ischemic changes.   - 10/15 LHC: prox LCx 80% ISR. mid LCx 80% ISR. RRA access  - Access site stable w/o hematoma or bleed.   10/16 transferred to [facility] for Staged PCI with DCB of LCx on 10/17 with Dr Pettit.

## 2024-10-16 NOTE — PROVIDER CONTACT NOTE (HYPOGLYCEMIA EVENT) - NS PROVIDER CONTACT RECOMMEND-HYPO
Patient still NPO for transfer to [facility] and procedure. As per ACP, hold sliding scale and continue to monitor blood glucose q6 hours.

## 2024-10-16 NOTE — PROVIDER CONTACT NOTE (HYPOGLYCEMIA EVENT) - NS PROVIDER CONTACT BACKGROUND-HYPO
Age: 64y    Gender: Male    POCT Blood Glucose:  186 mg/dL (10-16-24 @ 16:10)  67 mg/dL (10-16-24 @ 15:54)  97 mg/dL (10-16-24 @ 11:30)  105 mg/dL (10-16-24 @ 06:34)  153 mg/dL (10-15-24 @ 21:13)      eMAR:  dextrose 50% Injectable   12.5 Gram(s) IV Push (10-16-24 @ 16:00)    insulin glargine Injectable (LANTUS)   20 Unit(s) SubCutaneous (10-15-24 @ 21:23)    rosuvastatin   20 milliGRAM(s) Oral (10-15-24 @ 21:13)

## 2024-10-16 NOTE — DISCHARGE NOTE PROVIDER - NSDCCPCAREPLAN_GEN_ALL_CORE_FT
PRINCIPAL DISCHARGE DIAGNOSIS  Diagnosis: Chest pain with high risk for cardiac etiology  Assessment and Plan of Treatment: You are being transferred to [facility] for additional cardiac procedure, staged PCI with Dr. Pettit

## 2024-10-16 NOTE — DISCHARGE NOTE PROVIDER - NSDCMRMEDTOKEN_GEN_ALL_CORE_FT
aspirin 81 mg oral capsule: 1 tab(s) orally once a day  carvedilol 3.125 mg oral tablet: 1 tab(s) orally every 12 hours  Farxiga 10 mg oral tablet: 1 tab(s) orally once a day  insulin glargine 100 units/mL subcutaneous solution: 20 unit(s) subcutaneous once a day (at bedtime)  Januvia 100 mg oral tablet: 1 tab(s) orally once a day  rosuvastatin 20 mg oral tablet: 1 tab(s) orally once a day (at bedtime)  sacubitril-valsartan 24 mg-26 mg oral tablet: 1 tab(s) orally 2 times a day  ticagrelor 90 mg oral tablet: 1 tab(s) orally every 12 hours

## 2024-10-16 NOTE — DISCHARGE NOTE NURSING/CASE MANAGEMENT/SOCIAL WORK - PATIENT PORTAL LINK FT
You can access the FollowMyHealth Patient Portal offered by [facility] by registering at the following website: [email or web address]. By joining [employer]’s FollowMyHealth portal, you will also be able to view your health information using other applications (apps) compatible with our system.

## 2024-10-17 VITALS
RESPIRATION RATE: 19 BRPM | OXYGEN SATURATION: 97 % | SYSTOLIC BLOOD PRESSURE: 136 MMHG | TEMPERATURE: 98 F | DIASTOLIC BLOOD PRESSURE: 69 MMHG | HEART RATE: 77 BPM

## 2024-10-17 PROBLEM — I50.9 HEART FAILURE, UNSPECIFIED: Chronic | Status: ACTIVE | Noted: 2024-10-15

## 2024-10-17 PROBLEM — I25.10 ATHEROSCLEROTIC HEART DISEASE OF NATIVE CORONARY ARTERY WITHOUT ANGINA PECTORIS: Chronic | Status: ACTIVE | Noted: 2024-10-15

## 2024-10-17 PROBLEM — I10 ESSENTIAL (PRIMARY) HYPERTENSION: Chronic | Status: ACTIVE | Noted: 2024-10-15

## 2024-10-17 PROBLEM — E11.9 TYPE 2 DIABETES MELLITUS WITHOUT COMPLICATIONS: Chronic | Status: ACTIVE | Noted: 2024-10-15

## 2024-10-17 PROBLEM — E78.5 HYPERLIPIDEMIA, UNSPECIFIED: Chronic | Status: ACTIVE | Noted: 2024-10-15

## 2024-10-17 LAB
A1C WITH ESTIMATED AVERAGE GLUCOSE RESULT: 6.7 % — HIGH (ref 4–5.6)
ANION GAP SERPL CALC-SCNC: 15 MMOL/L — SIGNIFICANT CHANGE UP (ref 5–17)
BUN SERPL-MCNC: 19 MG/DL — SIGNIFICANT CHANGE UP (ref 7–23)
CALCIUM SERPL-MCNC: 9.5 MG/DL — SIGNIFICANT CHANGE UP (ref 8.4–10.5)
CHLORIDE SERPL-SCNC: 102 MMOL/L — SIGNIFICANT CHANGE UP (ref 96–108)
CHOLEST SERPL-MCNC: 142 MG/DL — SIGNIFICANT CHANGE UP
CO2 SERPL-SCNC: 21 MMOL/L — LOW (ref 22–31)
CREAT SERPL-MCNC: 0.7 MG/DL — SIGNIFICANT CHANGE UP (ref 0.5–1.3)
EGFR: 104 ML/MIN/1.73M2 — SIGNIFICANT CHANGE UP
ESTIMATED AVERAGE GLUCOSE: 146 MG/DL — HIGH (ref 68–114)
GLUCOSE BLDC GLUCOMTR-MCNC: 163 MG/DL — HIGH (ref 70–99)
GLUCOSE BLDC GLUCOMTR-MCNC: 76 MG/DL — SIGNIFICANT CHANGE UP (ref 70–99)
GLUCOSE BLDC GLUCOMTR-MCNC: 89 MG/DL — SIGNIFICANT CHANGE UP (ref 70–99)
GLUCOSE SERPL-MCNC: 149 MG/DL — HIGH (ref 70–99)
HCT VFR BLD CALC: 44.6 % — SIGNIFICANT CHANGE UP (ref 39–50)
HDLC SERPL-MCNC: 45 MG/DL — SIGNIFICANT CHANGE UP
HGB BLD-MCNC: 15.2 G/DL — SIGNIFICANT CHANGE UP (ref 13–17)
LIPID PNL WITH DIRECT LDL SERPL: 87 MG/DL — SIGNIFICANT CHANGE UP
MAGNESIUM SERPL-MCNC: 2.2 MG/DL — SIGNIFICANT CHANGE UP (ref 1.6–2.6)
MCHC RBC-ENTMCNC: 30.9 PG — SIGNIFICANT CHANGE UP (ref 27–34)
MCHC RBC-ENTMCNC: 34.1 GM/DL — SIGNIFICANT CHANGE UP (ref 32–36)
MCV RBC AUTO: 90.7 FL — SIGNIFICANT CHANGE UP (ref 80–100)
NON HDL CHOLESTEROL: 97 MG/DL — SIGNIFICANT CHANGE UP
NRBC # BLD: 0 /100 WBCS — SIGNIFICANT CHANGE UP (ref 0–0)
PLATELET # BLD AUTO: 234 K/UL — SIGNIFICANT CHANGE UP (ref 150–400)
POTASSIUM SERPL-MCNC: 4.1 MMOL/L — SIGNIFICANT CHANGE UP (ref 3.5–5.3)
POTASSIUM SERPL-SCNC: 4.1 MMOL/L — SIGNIFICANT CHANGE UP (ref 3.5–5.3)
RBC # BLD: 4.92 M/UL — SIGNIFICANT CHANGE UP (ref 4.2–5.8)
RBC # FLD: 14 % — SIGNIFICANT CHANGE UP (ref 10.3–14.5)
SODIUM SERPL-SCNC: 138 MMOL/L — SIGNIFICANT CHANGE UP (ref 135–145)
TRIGL SERPL-MCNC: 48 MG/DL — SIGNIFICANT CHANGE UP
TROPONIN T, HIGH SENSITIVITY RESULT: 23 NG/L — SIGNIFICANT CHANGE UP (ref 0–51)
WBC # BLD: 7.6 K/UL — SIGNIFICANT CHANGE UP (ref 3.8–10.5)
WBC # FLD AUTO: 7.6 K/UL — SIGNIFICANT CHANGE UP (ref 3.8–10.5)

## 2024-10-17 PROCEDURE — C1769: CPT

## 2024-10-17 PROCEDURE — C1761: CPT

## 2024-10-17 PROCEDURE — 83735 ASSAY OF MAGNESIUM: CPT

## 2024-10-17 PROCEDURE — 93010 ELECTROCARDIOGRAM REPORT: CPT

## 2024-10-17 PROCEDURE — 80048 BASIC METABOLIC PNL TOTAL CA: CPT

## 2024-10-17 PROCEDURE — C1887: CPT

## 2024-10-17 PROCEDURE — 36415 COLL VENOUS BLD VENIPUNCTURE: CPT

## 2024-10-17 PROCEDURE — 83036 HEMOGLOBIN GLYCOSYLATED A1C: CPT

## 2024-10-17 PROCEDURE — 82962 GLUCOSE BLOOD TEST: CPT

## 2024-10-17 PROCEDURE — 92920 PRQ TRLUML C ANGIOP 1ART&/BR: CPT | Mod: LC

## 2024-10-17 PROCEDURE — 93005 ELECTROCARDIOGRAM TRACING: CPT

## 2024-10-17 PROCEDURE — 80061 LIPID PANEL: CPT

## 2024-10-17 PROCEDURE — C1725: CPT

## 2024-10-17 PROCEDURE — C1894: CPT

## 2024-10-17 PROCEDURE — 92972 PERQ TRLUML CORONRY LITHOTRP: CPT

## 2024-10-17 PROCEDURE — 99152 MOD SED SAME PHYS/QHP 5/>YRS: CPT

## 2024-10-17 PROCEDURE — 84484 ASSAY OF TROPONIN QUANT: CPT

## 2024-10-17 PROCEDURE — 85027 COMPLETE CBC AUTOMATED: CPT

## 2024-10-17 PROCEDURE — 93306 TTE W/DOPPLER COMPLETE: CPT | Mod: 26

## 2024-10-17 PROCEDURE — 99232 SBSQ HOSP IP/OBS MODERATE 35: CPT

## 2024-10-17 PROCEDURE — C8929: CPT

## 2024-10-17 RX ORDER — TICAGRELOR 60 MG/1
1 TABLET ORAL
Qty: 58 | Refills: 0
Start: 2024-10-17 | End: 2024-11-14

## 2024-10-17 RX ORDER — ASPIRIN 325 MG
1 TABLET ORAL
Qty: 90 | Refills: 3
Start: 2024-10-17 | End: 2025-10-11

## 2024-10-17 RX ORDER — ASPIRIN 325 MG
1 TABLET ORAL
Refills: 0 | DISCHARGE

## 2024-10-17 RX ORDER — SODIUM CHLORIDE 0.9 % (FLUSH) 0.9 %
1000 SYRINGE (ML) INJECTION
Refills: 0 | Status: DISCONTINUED | OUTPATIENT
Start: 2024-10-17 | End: 2024-10-17

## 2024-10-17 RX ADMIN — Medication 3.12 MILLIGRAM(S): at 17:43

## 2024-10-17 RX ADMIN — Medication 1: at 16:19

## 2024-10-17 RX ADMIN — Medication 50 MILLILITER(S): at 15:25

## 2024-10-17 RX ADMIN — SACUBITRIL AND VALSARTAN 1 TABLET(S): 97; 103 TABLET, FILM COATED ORAL at 06:39

## 2024-10-17 RX ADMIN — SACUBITRIL AND VALSARTAN 1 TABLET(S): 97; 103 TABLET, FILM COATED ORAL at 17:43

## 2024-10-17 RX ADMIN — Medication 81 MILLIGRAM(S): at 06:39

## 2024-10-17 RX ADMIN — Medication 3.12 MILLIGRAM(S): at 06:39

## 2024-10-17 RX ADMIN — TICAGRELOR 90 MILLIGRAM(S): 60 TABLET ORAL at 17:43

## 2024-10-17 RX ADMIN — TICAGRELOR 90 MILLIGRAM(S): 60 TABLET ORAL at 06:39

## 2024-10-17 NOTE — DISCHARGE NOTE NURSING/CASE MANAGEMENT/SOCIAL WORK - PATIENT PORTAL LINK FT
You can access the FollowMyHealth Patient Portal offered by [facility] by registering at the following website: [email or web address]. By joining [employer]’s FollowMyHealth portal, you will also be able to view your health information using other applications (apps) compatible with our system.

## 2024-10-17 NOTE — DISCHARGE NOTE PROVIDER - NSDCQMAMI_CARD_ALL_CORE
Health Maintenance Due   Topic Date Due   • Shingles Vaccine (1 of 2) Never done   • Colonoscopy Risk  10/04/2016   • DTaP/Tdap/Td Vaccine (2 - Td) 01/01/2018   • Medicare Wellness Visit  06/15/2021       Patient is due for topics listed above, she wishes to proceed with MWV, but is not proceeding with Immunization(s) COVID-19, Dtap/Tdap/Td and Shingles at this time. The following has occurred: MWV performed today.      No

## 2024-10-17 NOTE — DISCHARGE NOTE PROVIDER - NSDCMRMEDTOKEN_GEN_ALL_CORE_FT
aspirin 81 mg oral capsule: 1 tab(s) orally once a day home/hospital  carvedilol 3.125 mg oral tablet: 1 tab(s) orally every 12 hours home/hospital  Farxiga 10 mg oral tablet: 1 tab(s) orally once a day home  Januvia 100 mg oral tablet: 1 tab(s) orally once a day home  rosuvastatin 20 mg oral tablet: 1 tab(s) orally once a day (at bedtime) home/hospital  sacubitril-valsartan 24 mg-26 mg oral tablet: 1 tab(s) orally 2 times a day home/hospital  ticagrelor 90 mg oral tablet: 1 tab(s) orally every 12 hours hospital   aspirin 81 mg oral capsule: 1 tab(s) orally once a day home/hospital  Cardiac Rehab: three times per week for 12 weeks post PCI:  Please note, in addition to this you will also require a formal referral and/or a prescription from your outpatient cardiologist in order to enroll in rehab  carvedilol 3.125 mg oral tablet: 1 tab(s) orally every 12 hours home/hospital  Farxiga 10 mg oral tablet: 1 tab(s) orally once a day home  Januvia 100 mg oral tablet: 1 tab(s) orally once a day home  rosuvastatin 20 mg oral tablet: 1 tab(s) orally once a day (at bedtime) home/hospital  sacubitril-valsartan 24 mg-26 mg oral tablet: 1 tab(s) orally 2 times a day home/hospital  ticagrelor 90 mg oral tablet: 1 tab(s) orally every 12 hours hospital

## 2024-10-17 NOTE — DISCHARGE NOTE PROVIDER - HOSPITAL COURSE
Cardiac Rehab Post PCI):              *Education on benefits of Cardiac Rehab provided to patient: Yes         *Referral and Prescription Given for Cardiac Rehab : Yes         *Pt given list of locations & instructed to contact their insurance company to review list of participating providers         *Pt instructed to bring Cardiac Rehab prescription with them to Cardiology Follow up appointment for assistance with enrollment: Yes         *Pt discharged with copies detail cardiovascular history, medications, testing/treatments         65 y/o male with a PMHx of CAD s/p stent placement x 5, ischemic cardiomyopathy (unknown LVEF), HTN, HLD and DM presented to ED with chest pain and was admitted for ACS R/O on 10/15/24.  Upon arrival to ED, EKG showed NSR at 74 bpm with LBBB and LAD, no ischemic changes. Initial troponin 9.  Pt underwent LHC 10/15:  prox LCx 80% ISR. mid LCx 80% ISR.   - 10/15:  EKG showed NSR at 74 bpm with LBBB and LAD, no ischemic changes.   - 10/15 LHC: prox LCx 80% ISR. mid LCx 80% ISR. RRA access  - Access site stable w/o hematoma or bleed.   10/16 transferred to [facility] for Staged PCI.   10/17 POBA of prox circumflex (90%) via right radial artery. Site without bleed or hematoma post band removal. Pt stable for discharge.         Cardiac Rehab Post PCI):              *Education on benefits of Cardiac Rehab provided to patient: Yes         *Referral and Prescription Given for Cardiac Rehab : Yes         *Pt given list of locations & instructed to contact their insurance company to review list of participating providers         *Pt instructed to bring Cardiac Rehab prescription with them to Cardiology Follow up appointment for assistance with enrollment: Yes         *Pt discharged with copies detail cardiovascular history, medications, testing/treatments

## 2024-10-17 NOTE — DISCHARGE NOTE PROVIDER - NSDCCPTREATMENT_GEN_ALL_CORE_FT
PRINCIPAL PROCEDURE  Procedure: Angioplasty, coronary balloon  Findings and Treatment: 10/17/24 : proximal circumflex artery balloon

## 2024-10-17 NOTE — DISCHARGE NOTE PROVIDER - CARE PROVIDER_API CALL
Shaik Randall Pettit  Cardiology  [address] [facility] [place] [address]  Phone: [phone]  Fax: [phone]  Follow Up Time: 2 weeks

## 2024-10-17 NOTE — DISCHARGE NOTE NURSING/CASE MANAGEMENT/SOCIAL WORK - FINANCIAL ASSISTANCE
[facility] provides services at a reduced cost to those who are determined to be eligible through [facility]’s financial assistance program. Information regarding [facility]’s financial assistance program can be found by going to [email or web address] or by calling [phone].

## 2024-10-17 NOTE — DISCHARGE NOTE NURSING/CASE MANAGEMENT/SOCIAL WORK - NSDCPEFALRISK_GEN_ALL_CORE
For information on Fall & Injury Prevention, visit: [email or web address] OR  [email or web address] OR  https://www.cdc.gov/steadi/patient.html

## 2024-10-17 NOTE — CHART NOTE - NSCHARTNOTEFT_GEN_A_CORE
Pt returns to [facility] s/p POBA of CX via right radial artery. Site without bleed or hematoma, +2 DP/PT pulses, +CMS. Pt denies CP/SOB. VSS.  EKG without significant changes. Gentle post procedure hydration infusing. Cont ASA/Brilinta (copy $1/month).  Anticipate d/c later this evening if condition remains stable.       Henry Chance, NP

## 2024-11-16 RX ORDER — TICAGRELOR 60 MG/1
1 TABLET ORAL
Qty: 180 | Refills: 3
Start: 2024-11-16 | End: 2025-11-10
